# Patient Record
Sex: MALE | Race: WHITE
[De-identification: names, ages, dates, MRNs, and addresses within clinical notes are randomized per-mention and may not be internally consistent; named-entity substitution may affect disease eponyms.]

---

## 2018-03-05 ENCOUNTER — HOSPITAL ENCOUNTER (EMERGENCY)
Dept: HOSPITAL 76 - ED | Age: 63
Discharge: HOME | End: 2018-03-05
Payer: COMMERCIAL

## 2018-03-05 VITALS — SYSTOLIC BLOOD PRESSURE: 125 MMHG | DIASTOLIC BLOOD PRESSURE: 101 MMHG
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
## 2018-03-05 DIAGNOSIS — S93.492A: Primary | ICD-10-CM
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
## 2018-03-05 PROCEDURE — 99283 EMERGENCY DEPT VISIT LOW MDM: CPT

## 2018-03-05 NOTE — ED PHYSICIAN DOCUMENTATION
PD HPI LOWER EXT INJURY





- Stated complaint


Stated Complaint: FELL LT ANKLE/FOOT PX





- Chief complaint


Chief Complaint: Trauma Ext





- History obtained from


History obtained from: Patient





- History of Present Illness


PD HPI LOW EXT INJURY LOCATION: Left, Ankle


Type of injury: Fall (accidentally stepped off edge of decking about a 3 foot 

drop, landed onto left ankle with twisting inversion. Pain at time and hurting 

for walking on it.)


Where injury occurred: Work


Timing - onset: Today


Timing - details: Abrupt onset


Worsened by: Moving, Palpating


Associated symptoms: Swelling.  No: Weakness, Numbness


Similar symptoms before: Has not had sx before





Review of Systems


Skin: denies: Abrasion (s), Laceration (s)


Musculoskeletal: reports: Extremity swelling


Neurologic: denies: Focal weakness, Numbness, Headache, Head injury





PD PAST MEDICAL HISTORY





- Past Medical History


Past Medical History: No





- Past Surgical History


Past Surgical History: No





- Present Medications


Home Medications: 


 Ambulatory Orders











 Medication  Instructions  Recorded  Confirmed


 


Aspirin 1 tab PO DAILY 03/05/18 03/05/18














- Allergies


Allergies/Adverse Reactions: 


 Allergies











Allergy/AdvReac Type Severity Reaction Status Date / Time


 


No Known Drug Allergies Allergy   Verified 03/05/18 15:15














- Social History


Does the pt smoke?: No


Smoking Status: Never smoker


Does the pt drink ETOH?: Yes


ETOH Use: Wine


Does the pt have substance abuse?: No





- Immunizations


Immunizations are current?: Yes





- POLST


Patient has POLST: No





PD ED PE NORMAL





- Vitals


Vital signs reviewed: Yes





- General


General: Alert and oriented X 3, No acute distress, Well developed/nourished





- Derm


Derm: Normal color, Warm and dry





- Extremities


Extremities: No: Normal ROM s pain (left ankle alterally with swelling and 

tenderness. Achilles and medially not tender. Hurts for inversion but not 

overtly lax. Limited though due to hurting and swelling. )





- Neuro


Neuro: No motor deficit, No sensory deficit





Results





- Vitals


Vitals: 


 Vital Signs - 24 hr











  03/05/18





  15:12


 


Temperature 37.2 C


 


Heart Rate 63


 


Respiratory 14





Rate 


 


Blood Pressure 125/101 H


 


O2 Saturation 98








 Oxygen











O2 Source                      Room air

















- Rads (name of study)


  ** ankle


Radiology: Prelim report reviewed, EMP read contemporaneously (no fractures)





PD MEDICAL DECISION MAKING





- ED course


Complexity details: reviewed results (no fracture but good injury and swelling 

suggestive of torn ligaments. Does not feel overtly lax on simple stress 

testing but limited due to pain and swelling. ), considered differential, d/w 

patient





Departure





- Departure


Disposition: 01 Home, Self Care


Clinical Impression: 


Ankle sprain


Qualifiers:


 Encounter type: initial encounter Involved ligament of ankle: other ligament 

Laterality: left Qualified Code(s): S93.492A - Sprain of other ligament of left 

ankle, initial encounter





Condition: Stable


Record reviewed to determine appropriate education?: Yes


Instructions:  ED Sprain Ankle W X Ray


Comments: 


Using ankle brace when up and around until fully healed which may be 2-4 weeks.

  Ice elevate and rest the ankle often today and tomorrow.  Light walking on it 

is okay.  Progress weightbearing and walking and activity as able based on 

comfort.  The swelling should reduce over several days.  He will likely hurt 

with weight weighted walking such as carrying objects and with uneven ground.  

Recheck if not well improving in 1-2 weeks and mostly healed by 3-4 weeks. 

Tylenol or ibuprofen if needed for pains.


Discharge Date/Time: 03/05/18 16:50

## 2018-03-05 NOTE — XRAY REPORT
EXAM:

LEFT ANKLE RADIOGRAPHY

 

EXAM DATE: 3/5/2018 03:34 PM.

 

CLINICAL HISTORY: Trauma, pain.

 

COMPARISON: None.

 

TECHNIQUE: 3 views.

 

FINDINGS: 

Bones: Normal. No fractures or bone lesions.

 

Joints: Normal. No effusion. No subluxations. The ankle mortise is normally aligned.

 

Soft Tissues: Mild soft tissue swelling over the malleoli.

 

IMPRESSION: Soft tissue swelling.

 

[facility]

Referring Provider Line: [phone]

 

SITE ID: [id number]